# Patient Record
Sex: MALE | Race: WHITE | Employment: FULL TIME | URBAN - METROPOLITAN AREA
[De-identification: names, ages, dates, MRNs, and addresses within clinical notes are randomized per-mention and may not be internally consistent; named-entity substitution may affect disease eponyms.]

---

## 2022-12-31 ENCOUNTER — HOSPITAL ENCOUNTER (EMERGENCY)
Age: 52
Discharge: HOME | End: 2022-12-31
Attending: EMERGENCY MEDICINE
Payer: COMMERCIAL

## 2022-12-31 ENCOUNTER — APPOINTMENT (OUTPATIENT)
Dept: CT IMAGING | Age: 52
End: 2022-12-31
Payer: COMMERCIAL

## 2022-12-31 VITALS
BODY MASS INDEX: 43.16 KG/M2 | TEMPERATURE: 97.5 F | HEIGHT: 67 IN | SYSTOLIC BLOOD PRESSURE: 114 MMHG | WEIGHT: 275 LBS | RESPIRATION RATE: 18 BRPM | HEART RATE: 88 BPM | DIASTOLIC BLOOD PRESSURE: 71 MMHG | OXYGEN SATURATION: 96 %

## 2022-12-31 DIAGNOSIS — A09 DIARRHEA OF INFECTIOUS ORIGIN: ICD-10-CM

## 2022-12-31 DIAGNOSIS — K57.32 DIVERTICULITIS OF SIGMOID COLON: Primary | ICD-10-CM

## 2022-12-31 DIAGNOSIS — R10.32 ABDOMINAL PAIN, LEFT LOWER QUADRANT: ICD-10-CM

## 2022-12-31 LAB
ALBUMIN SERPL-MCNC: 4.3 GM/DL (ref 3.4–5)
ALP BLD-CCNC: 69 IU/L (ref 40–129)
ALT SERPL-CCNC: 26 U/L (ref 10–40)
ANION GAP SERPL CALCULATED.3IONS-SCNC: 8 MMOL/L (ref 4–16)
AST SERPL-CCNC: 19 IU/L (ref 15–37)
BASOPHILS ABSOLUTE: 0.1 K/CU MM
BASOPHILS RELATIVE PERCENT: 0.4 % (ref 0–1)
BILIRUB SERPL-MCNC: 0.6 MG/DL (ref 0–1)
BILIRUBIN URINE: NEGATIVE MG/DL
BLOOD, URINE: NEGATIVE
BUN BLDV-MCNC: 17 MG/DL (ref 6–23)
CALCIUM SERPL-MCNC: 9.1 MG/DL (ref 8.3–10.6)
CHLORIDE BLD-SCNC: 102 MMOL/L (ref 99–110)
CLARITY: CLEAR
CO2: 29 MMOL/L (ref 21–32)
COLOR: YELLOW
COMMENT UA: NORMAL
CREAT SERPL-MCNC: 1.2 MG/DL (ref 0.9–1.3)
DIFFERENTIAL TYPE: ABNORMAL
EOSINOPHILS ABSOLUTE: 0.1 K/CU MM
EOSINOPHILS RELATIVE PERCENT: 1 % (ref 0–3)
GFR SERPL CREATININE-BSD FRML MDRD: >60 ML/MIN/1.73M2
GLUCOSE BLD-MCNC: 89 MG/DL (ref 70–99)
GLUCOSE, URINE: NEGATIVE MG/DL
HCT VFR BLD CALC: 46.9 % (ref 42–52)
HEMOGLOBIN: 15.7 GM/DL (ref 13.5–18)
IMMATURE NEUTROPHIL %: 0.3 % (ref 0–0.43)
KETONES, URINE: NEGATIVE MG/DL
LEUKOCYTE ESTERASE, URINE: NEGATIVE
LIPASE: 25 IU/L (ref 13–60)
LYMPHOCYTES ABSOLUTE: 2.5 K/CU MM
LYMPHOCYTES RELATIVE PERCENT: 17.3 % (ref 24–44)
MCH RBC QN AUTO: 31.6 PG (ref 27–31)
MCHC RBC AUTO-ENTMCNC: 33.5 % (ref 32–36)
MCV RBC AUTO: 94.4 FL (ref 78–100)
MONOCYTES ABSOLUTE: 1.2 K/CU MM
MONOCYTES RELATIVE PERCENT: 8.3 % (ref 0–4)
NITRITE URINE, QUANTITATIVE: NEGATIVE
PDW BLD-RTO: 12.8 % (ref 11.7–14.9)
PH, URINE: 7 (ref 5–8)
PLATELET # BLD: 220 K/CU MM (ref 140–440)
PMV BLD AUTO: 11.1 FL (ref 7.5–11.1)
POTASSIUM SERPL-SCNC: 4.4 MMOL/L (ref 3.5–5.1)
PROTEIN UA: NEGATIVE MG/DL
RBC # BLD: 4.97 M/CU MM (ref 4.6–6.2)
SEGMENTED NEUTROPHILS ABSOLUTE COUNT: 10.3 K/CU MM
SEGMENTED NEUTROPHILS RELATIVE PERCENT: 72.7 % (ref 36–66)
SODIUM BLD-SCNC: 139 MMOL/L (ref 135–145)
SPECIFIC GRAVITY UA: <1.005 (ref 1–1.03)
TOTAL IMMATURE NEUTOROPHIL: 0.04 K/CU MM
TOTAL PROTEIN: 7.4 GM/DL (ref 6.4–8.2)
UROBILINOGEN, URINE: 0.2 MG/DL (ref 0.2–1)
WBC # BLD: 14.2 K/CU MM (ref 4–10.5)

## 2022-12-31 PROCEDURE — 99285 EMERGENCY DEPT VISIT HI MDM: CPT

## 2022-12-31 PROCEDURE — 96374 THER/PROPH/DIAG INJ IV PUSH: CPT

## 2022-12-31 PROCEDURE — 6360000002 HC RX W HCPCS: Performed by: EMERGENCY MEDICINE

## 2022-12-31 PROCEDURE — 6360000004 HC RX CONTRAST MEDICATION: Performed by: EMERGENCY MEDICINE

## 2022-12-31 PROCEDURE — 85025 COMPLETE CBC W/AUTO DIFF WBC: CPT

## 2022-12-31 PROCEDURE — 2580000003 HC RX 258: Performed by: EMERGENCY MEDICINE

## 2022-12-31 PROCEDURE — 74177 CT ABD & PELVIS W/CONTRAST: CPT

## 2022-12-31 PROCEDURE — 81003 URINALYSIS AUTO W/O SCOPE: CPT

## 2022-12-31 PROCEDURE — 83690 ASSAY OF LIPASE: CPT

## 2022-12-31 PROCEDURE — 80053 COMPREHEN METABOLIC PANEL: CPT

## 2022-12-31 RX ORDER — KETOROLAC TROMETHAMINE 30 MG/ML
15 INJECTION, SOLUTION INTRAMUSCULAR; INTRAVENOUS ONCE
Status: COMPLETED | OUTPATIENT
Start: 2022-12-31 | End: 2022-12-31

## 2022-12-31 RX ORDER — METRONIDAZOLE 500 MG/1
500 TABLET ORAL 3 TIMES DAILY
Qty: 30 TABLET | Refills: 0 | Status: SHIPPED | OUTPATIENT
Start: 2022-12-31 | End: 2023-01-10

## 2022-12-31 RX ORDER — CIPROFLOXACIN 500 MG/1
500 TABLET, FILM COATED ORAL 2 TIMES DAILY
Qty: 20 TABLET | Refills: 0 | Status: SHIPPED | OUTPATIENT
Start: 2022-12-31 | End: 2023-01-10

## 2022-12-31 RX ORDER — HYDROCODONE BITARTRATE AND ACETAMINOPHEN 5; 325 MG/1; MG/1
1 TABLET ORAL EVERY 8 HOURS PRN
Qty: 12 TABLET | Refills: 0 | Status: SHIPPED | OUTPATIENT
Start: 2022-12-31 | End: 2023-01-03

## 2022-12-31 RX ORDER — 0.9 % SODIUM CHLORIDE 0.9 %
1000 INTRAVENOUS SOLUTION INTRAVENOUS ONCE
Status: COMPLETED | OUTPATIENT
Start: 2022-12-31 | End: 2022-12-31

## 2022-12-31 RX ADMIN — KETOROLAC TROMETHAMINE 15 MG: 30 INJECTION, SOLUTION INTRAMUSCULAR; INTRAVENOUS at 13:33

## 2022-12-31 RX ADMIN — IOPAMIDOL 75 ML: 755 INJECTION, SOLUTION INTRAVENOUS at 14:09

## 2022-12-31 RX ADMIN — SODIUM CHLORIDE 1000 ML: 9 INJECTION, SOLUTION INTRAVENOUS at 13:33

## 2022-12-31 ASSESSMENT — PAIN SCALES - GENERAL
PAINLEVEL_OUTOF10: 8

## 2022-12-31 ASSESSMENT — PAIN DESCRIPTION - PAIN TYPE: TYPE: ACUTE PAIN

## 2022-12-31 ASSESSMENT — PAIN DESCRIPTION - ORIENTATION
ORIENTATION: LEFT

## 2022-12-31 ASSESSMENT — PAIN DESCRIPTION - FREQUENCY: FREQUENCY: CONTINUOUS

## 2022-12-31 ASSESSMENT — PAIN DESCRIPTION - LOCATION
LOCATION: ABDOMEN

## 2022-12-31 ASSESSMENT — PAIN DESCRIPTION - DESCRIPTORS: DESCRIPTORS: ACHING

## 2022-12-31 ASSESSMENT — PAIN - FUNCTIONAL ASSESSMENT: PAIN_FUNCTIONAL_ASSESSMENT: 0-10

## 2022-12-31 NOTE — ED PROVIDER NOTES
Emergency Department Encounter  [address]    Patient: Precious Soriano  MRN: [id number]  : 1970  Date of Evaluation: 2022  ED Provider: Bell Vo MD    Chief Complaint       Chief Complaint   Patient presents with    Abdominal Pain    Diarrhea     Reports of waking up in the night with abdominal pain and diarrhea. He reports a history of Diverticulitis. Soto Loredo is a 50 y.o. male who presents to the emergency department for evaluation of left lower quadrant abdominal pain. Patient reports been usual state of health until approximate 2-3 o'clock this morning. He says he woke up in middle night with a sharp stabbing pain in his left lower quadrant. Did not radiate did not migrate. Was associated with profuse nonbloody diarrhea. He says this is very similar to previous episodes of diverticulitis that he had in the past.  No history of any abdominal surgeries. No recent change of diet or medications no fevers. Patient reports that the pain is gradually worsened over the past several hours which prompted his visit to the emergency department today. He does have a follow-up appointment with gastroenterology scheduled for  for evaluation of a colonoscopy. No reported history of any previous colonoscopies. He does report he said 6 previous episodes of diverticulitis in the past.    ROS:     At least 10 systems reviewed and otherwise acutely negative except as in the [address]     Past History     Past Medical History:   Diagnosis Date    COPD (chronic obstructive pulmonary disease) ([facility] [address]     Diverticulitis     Thyroid disease      Past Surgical History:   Procedure Laterality Date    TONSILLECTOMY       Social History     Socioeconomic History    Marital status:      Spouse name: None    Number of children: None    Years of education: None    Highest education level: None   Tobacco Use    Smoking status: Former     Packs/day: 1.00 Years: 20.00     Pack years: 20.00     Types: Cigarettes    Smokeless tobacco: Never   Substance and Sexual Activity    Alcohol use: No    Drug use: No     Comment: heroin    Sexual activity: Yes     Partners: Female       Medications/Allergies     Previous Medications    ALBUTEROL (PROVENTIL HFA) 108 (90 BASE) MCG/ACT INHALER    Inhale 2 puffs into the lungs every 6 hours as needed for Wheezing. LEVOTHYROXINE (SYNTHROID) 200 MCG INJECTION    Infuse 275 mcg intravenously daily    NAPROXEN (NAPROSYN) 500 MG TABLET    Take 1 tablet by mouth 2 times daily as needed for Pain    TRAMADOL (ULTRAM) 50 MG TABLET    Take 1 tablet by mouth every 6 hours as needed for Pain     No Known Allergies     Physical Exam       ED Triage Vitals [12/31/22 1309]   BP Temp Temp Source Heart Rate Resp SpO2 Height Weight   (!) 137/95 97.5 °F (36.4 °C) Infrared 100 18 96 % 5' 7\" (1.702 m) 275 lb (124.7 kg)     GENERAL APPEARANCE: Awake and alert. Cooperative. No acute distress. HEAD: Normocephalic. Atraumatic. EYES: Sclera anicteric. Pupils equal round reactive to light extraocular movements are intact  ENT: Tolerates saliva. No trismus. Moist mucous membranes  NECK: Supple. Trachea midline. No meningismus  CARDIO: RRR. Radial pulse 2+. No murmurs rubs or gallops appreciated  LUNGS: Respirations unlabored. CTAB. No accessory muscle usage noted. No wheezes rales rhonchi or stridor. ABDOMEN: Soft. Non-distended. Non-tender. No tenderness in right upper quadrant or right lower quadrant to deep palpation  EXTREMITIES: No acute deformities. No unilateral leg swelling or tenderness behind either one of calves  SKIN: Warm and dry. No erythema edema or rashes appreciated  NEUROLOGICAL:  Cranial nerves II through XII grossly intact. No gross facial drooping. Moves all 4 extremities spontaneously. PSYCHIATRIC: Normal mood. Alert and oriented x3. No reported active suicidality or homicidality.     Diagnostics   Labs:  Results for orders placed or performed during the hospital encounter of 12/31/22   CBC with Auto Differential   Result Value Ref Range    WBC 14.2 (H) 4.0 - 10.5 K/CU MM    RBC 4.97 4.6 - 6.2 M/CU MM    Hemoglobin 15.7 13.5 - 18.0 GM/DL    Hematocrit 46.9 42 - 52 %    MCV 94.4 78 - 100 FL    MCH 31.6 (H) 27 - 31 PG    MCHC 33.5 32.0 - 36.0 %    RDW 12.8 11.7 - 14.9 %    Platelets 168 602 - 239 K/CU MM    MPV 11.1 7.5 - 11.1 FL    Differential Type AUTOMATED DIFFERENTIAL     Segs Relative 72.7 (H) 36 - 66 %    Lymphocytes % 17.3 (L) 24 - 44 %    Monocytes % 8.3 (H) 0 - 4 %    Eosinophils % 1.0 0 - 3 %    Basophils % 0.4 0 - 1 %    Segs Absolute 10.3 K/CU MM    Lymphocytes Absolute 2.5 K/CU MM    Monocytes Absolute 1.2 K/CU MM    Eosinophils Absolute 0.1 K/CU MM    Basophils Absolute 0.1 K/CU MM    Immature Neutrophil % 0.3 0 - 0.43 %    Total Immature Neutrophil 0.04 K/CU MM   CMP   Result Value Ref Range    Sodium 139 135 - 145 MMOL/L    Potassium 4.4 3.5 - 5.1 MMOL/L    Chloride 102 99 - 110 mMol/L    CO2 29 21 - 32 MMOL/L    BUN 17 6 - 23 MG/DL    Creatinine 1.2 0.9 - 1.3 MG/DL    Est, Glom Filt Rate >60 >60 mL/min/1.73m2    Glucose 89 70 - 99 MG/DL    Calcium 9.1 8.3 - 10.6 MG/DL    Albumin 4.3 3.4 - 5.0 GM/DL    Total Protein 7.4 6.4 - 8.2 GM/DL    Total Bilirubin 0.6 0.0 - 1.0 MG/DL    ALT 26 10 - 40 U/L    AST 19 15 - 37 IU/L    Alkaline Phosphatase 69 40 - 129 IU/L    Anion Gap 8 4 - 16   Lipase   Result Value Ref Range    Lipase 25 13 - 60 IU/L     Radiographs:  CT ABDOMEN PELVIS W IV CONTRAST Additional Contrast? None    Result Date: 12/31/2022  EXAMINATION: CT OF THE ABDOMEN AND PELVIS WITH CONTRAST 12/31/2022 1:31 pm TECHNIQUE: CT of the abdomen and pelvis was performed with the administration of intravenous contrast. Multiplanar reformatted images are provided for review.  Automated exposure control, iterative reconstruction, and/or weight based adjustment of the mA/kV was utilized to reduce the radiation dose to as low as reasonably achievable. COMPARISON: None. HISTORY: ORDERING SYSTEM PROVIDED HISTORY: LLQ abd pain, hx diverticulitis TECHNOLOGIST PROVIDED HISTORY: Additional Contrast?->None Reason for exam:->LLQ abd pain, hx diverticulitis Decision Support Exception - unselect if not a suspected or confirmed emergency medical condition->Emergency Medical Condition (MA) Reason for Exam: LLQ abd pain, hx diverticulitis FINDINGS: Lower Chest: Within normal limits. Organs:   Few punctate nonobstructing left renal calculi. No hydronephrosis. Small simple left renal cyst.  Subcentimeter parenchymal lesions are too small to fully characterize. Diffuse hepatic steatosis. Otherwise unremarkable. GI/Bowel:   Acute sigmoid diverticulitis without perforation or abscess. Normal appendix. Pelvis: Within normal limits. Peritoneum/Retroperitoneum: Few mildly enlarged left para-aortic lymph nodes. Bones/Soft Tissues:   Within normal limits. Acute sigmoid diverticulitis without complication. Additional nonemergent findings, as above. Procedures/EKG:       ED Course and MDM   In brief, Samir Aguayo is a 50 y.o. male who presented to the emergency department for evaluation of left lower quadrant abdominal pain. Based on patient's history and physical would be concerned about possible acute diverticulitis. Of the possibility of patient's symptoms do include kidney stones although they do believe that this is less likely as he is denying dysuria or flank pain. Perforated diverticular disease would be of great concern given his history of multiple episodes of diverticulitis in the past as well. Patient is denying any testicular pain or discomfort making torsion less likely. No evidence of trauma noted on examination. I did review patient's imaging studies and laboratory work as noted above. Patient has evidence of acute uncomplicated sigmoid diverticulitis noted on CT scan. This information was relayed with the patient. Recommend starting the patient on ciprofloxacin and Flagyl for his symptoms due to in review of his previous visits to the hospital he has been discharged most frequently with Augmentin and is recurrence of diverticulitis does appear to be occurring more frequently. Recommend take the full course of antibiotics. Return precautions were discussed with him including but not limited to increasing pain, fevers, inability tolerating by mouth or any other concerning symptoms. Strongly encouraged him to get a colonoscopy performed within the next several months after this acute flare has subsided. I attempted to answer all the question best my ability patient and family do feel comfortable to be discharged home    ED Medication Orders (From admission, onward)      Start Ordered     Status Ordering Provider    12/31/22 1408 12/31/22 1409  iopamidol (ISOVUE-370) 76 % injection 75 mL  IMG ONCE PRN         Last MAR action: [facility]    12/31/22 1330 12/31/22 1326  0.9 % sodium chloride bolus  ONCE         Last MAR action: Stopped - by JAMEEL MIN on 12/31/22 at [address] [facility] Martinez    12/31/22 1330 12/31/22 1326  ketorolac (TORADOL) injection 15 mg  ONCE         Last MAR action: Given - by JAMEEL MIN on 12/31/22 at 1333 [facility] PAT            Final Impression      1. Diverticulitis of sigmoid colon    2. Abdominal pain, left lower quadrant    3.  Diarrhea of infectious origin      DISPOSITION Decision To Discharge 12/31/2022 02:58:59 PM         (Please note that portions of this note may have been completed with a voice recognition program. Efforts were made to edit the dictations but occasionally words are mis-transcribed.)    Hairni Agustin MD  [id number] Petros Blanco MD  12/31/22 8430

## 2022-12-31 NOTE — Clinical Note
Zoe Rodrigues was seen and treated in our emergency department on 12/31/2022. He may return to work on 01/03/2023. If you have any questions or concerns, please don't hesitate to call.       Pollo Davies MD

## 2022-12-31 NOTE — ED NOTES
Urine specimen collected and sent to the lab. Call light is within reach, family is at the bedside.           Ryanne Mckenna RN  12/31/22 1500

## 2022-12-31 NOTE — DISCHARGE INSTRUCTIONS
Take the full course of antibiotics. Please follow-up with your gastroenterologist as scheduled this week. Recommend you obtain a colonoscopy within the next several months after the most current flare has subsided.     Return to the emergency department for increasing pain, fevers, inability tolerating by mouth, any other concerning symptoms

## 2022-12-31 NOTE — ED NOTES
Family is at the bedside and the call light is within reach. The patient was notified that we need a urine specimen.             Kirstie Roberts RN  12/31/22 9633

## 2022-12-31 NOTE — Clinical Note
Martlel Hinojosa was seen and treated in our emergency department on 12/31/2022. He may return to work on . If you have any questions or concerns, please don't hesitate to call.       Ivelisse Suarez MD

## 2022-12-31 NOTE — ED NOTES
Discharge instructions and prescriptions were reviewed and the patient will follow up with the PCP. The patient will also follow up with GI on 1/6/2023 as already scheduled. The patient voiced understanding of these instructions.                 Kimberly Lomas RN  12/31/22 0442